# Patient Record
Sex: FEMALE | Race: WHITE | URBAN - METROPOLITAN AREA
[De-identification: names, ages, dates, MRNs, and addresses within clinical notes are randomized per-mention and may not be internally consistent; named-entity substitution may affect disease eponyms.]

---

## 2024-10-10 ENCOUNTER — OFFICE VISIT (OUTPATIENT)
Age: 24
End: 2024-10-10

## 2024-10-10 VITALS
SYSTOLIC BLOOD PRESSURE: 111 MMHG | OXYGEN SATURATION: 99 % | TEMPERATURE: 98.6 F | HEART RATE: 61 BPM | DIASTOLIC BLOOD PRESSURE: 71 MMHG

## 2024-10-10 DIAGNOSIS — H69.92 EUSTACHIAN TUBE DYSFUNCTION, LEFT: Primary | ICD-10-CM

## 2024-10-10 DIAGNOSIS — R09.82 POST-NASAL DRAINAGE: ICD-10-CM

## 2024-10-10 DIAGNOSIS — J30.89 ENVIRONMENTAL AND SEASONAL ALLERGIES: ICD-10-CM

## 2024-10-10 DIAGNOSIS — H92.02 EAR PAIN, LEFT: ICD-10-CM

## 2024-10-10 RX ORDER — FLUTICASONE PROPIONATE 50 MCG
2 SPRAY, SUSPENSION (ML) NASAL DAILY
Qty: 16 G | Refills: 0 | Status: SHIPPED | OUTPATIENT
Start: 2024-10-10

## 2024-10-10 RX ORDER — PREDNISONE 20 MG/1
20 TABLET ORAL 2 TIMES DAILY
Qty: 10 TABLET | Refills: 0 | Status: SHIPPED | OUTPATIENT
Start: 2024-10-10 | End: 2024-10-15

## 2024-10-10 ASSESSMENT — ENCOUNTER SYMPTOMS
RHINORRHEA: 1
VOMITING: 0
DIARRHEA: 0

## 2024-10-10 NOTE — PATIENT INSTRUCTIONS
Eustachian tube dysfunction  Prednisone  prescribed for treatment of the Eustachian tube inflammation.  Flonase nasal spray prescribed for topical steroid treatment of the Eustachian tube inflammation.  Pseudoephedrine recommended for congestion relief.  Do not take other decongestants while on this medication.  Recommend OTC treatment for symptoms:  ibuprofen (Advil, Motrin) and acetaminophen (Tylenol) for ear pain.  decongestants (specifically pseudoephedrine) <avoid if you have a history of high blood pressure or heart conditions>, along with antihistamines (Claritin, Zyrtec, Allegra) and nasal steroid sprays (Flonase) to help with nasal congestion and runny nose.  warm teas, humidifiers, nasal lavages, and sleeping in an inclined position are also helpful options that can lessen symptoms.  Recommend warm compresses over the symptomatic ear(s) for 10-15 minutes, or a hot shower, followed by 1-2 minutes of massaging the area behind your ears and down the jaw-line to help with the ear congestion  Seasonal Allergies:   OTC antihistamine such as cetirizine, loratadine   OTC  Flonase to aid with post nasal drip and congestion of the nose and sinuses. Use them for at least 7 days to help with this episode of allergy related irritation.  OTC saline mist spray such as [employer] Simply Saline to clear secretions and irritants from nasal pathway   Avoid triggers such as:  Outdoor pollen or mold spores, recommended to use air-conditioning, change or clean all filters monthly, and to keep windows closed.   Stay inside when pollen counts are high. Use a vacuum  with a HEPA filter or a double-thickness filter at least two times each week.  Stay inside when air pollution is bad, avoid paint fumes, perfumes, and other strong odors.  Suggested to avoid smoke.

## 2024-10-10 NOTE — PROGRESS NOTES
Beatriz Patel (: 2000) is a 24 y.o. female, New patient, here for evaluation of the following chief complaint(s):  URI (X 1 mo Congestion ,cough ,LT ear pain , )      ASSESSMENT/PLAN:    ICD-10-CM    1. Eustachian tube dysfunction, left  H69.92 predniSONE (DELTASONE) 20 MG tablet      2. Environmental and seasonal allergies  J30.89       3. Ear pain, left  H92.02       4. Post-nasal drainage  R09.82           Eustachian tube dysfunction  Prednisone  prescribed for treatment of the Eustachian tube inflammation.  Flonase nasal spray prescribed for topical steroid treatment of the Eustachian tube inflammation.  Pseudoephedrine recommended for congestion relief.  Do not take other decongestants while on this medication.  Recommend OTC treatment for symptoms:    Seasonal Allergies:   OTC antihistamine such as cetirizine, loratadine    Flonase to aid with post nasal drip and congestion of the nose and sinuses. Use them for at least 7 days to help with this episode of allergy related irritation.  OTC saline mist spray such as [employer] Simply Saline to clear secretions and irritants from nasal pathway   Avoid triggers     Discussed PCP follow up for persisting or worsening symptoms, or to return to the clinic if unable to obtain PCP follow up for worsening symptoms.    The patient tolerated their visit well. The patient and/or the family were informed of the results of any tests, a time was given to answer questions, a plan was proposed and they agreed with plan. Reviewed AVS with treatment instructions and answered questions - pt/family expresses understanding and agreement with the discussed treatment plan and AVS instructions.      SUBJECTIVE/OBJECTIVE:  HPI:   24 y.o. female presents for complaint of nasal drainage, dry cough for about a month and ear pain for 1 day days.    Admits history of allergies  Denies fever    Nothing makes symptoms better.  Nothing makes symptoms worse.    Has attempted